# Patient Record
Sex: MALE | Race: WHITE | NOT HISPANIC OR LATINO | Employment: FULL TIME | URBAN - METROPOLITAN AREA
[De-identification: names, ages, dates, MRNs, and addresses within clinical notes are randomized per-mention and may not be internally consistent; named-entity substitution may affect disease eponyms.]

---

## 2018-01-06 ENCOUNTER — APPOINTMENT (OUTPATIENT)
Dept: RADIOLOGY | Facility: CLINIC | Age: 48
End: 2018-01-06
Payer: COMMERCIAL

## 2018-01-06 ENCOUNTER — TRANSCRIBE ORDERS (OUTPATIENT)
Dept: URGENT CARE | Facility: CLINIC | Age: 48
End: 2018-01-06

## 2018-01-06 ENCOUNTER — OFFICE VISIT (OUTPATIENT)
Dept: URGENT CARE | Facility: CLINIC | Age: 48
End: 2018-01-06
Payer: COMMERCIAL

## 2018-01-06 DIAGNOSIS — R05.9 COUGH: ICD-10-CM

## 2018-01-06 PROCEDURE — 71046 X-RAY EXAM CHEST 2 VIEWS: CPT

## 2018-01-06 PROCEDURE — G0383 LEV 4 HOSP TYPE B ED VISIT: HCPCS

## 2018-01-23 VITALS
HEART RATE: 77 BPM | HEIGHT: 68 IN | DIASTOLIC BLOOD PRESSURE: 85 MMHG | OXYGEN SATURATION: 97 % | SYSTOLIC BLOOD PRESSURE: 141 MMHG | WEIGHT: 198.25 LBS | TEMPERATURE: 99.1 F | RESPIRATION RATE: 16 BRPM | BODY MASS INDEX: 30.04 KG/M2

## 2018-01-24 NOTE — PROGRESS NOTES
Assessment    1  Cough (786 2) (R05)   2  Hypertension (401 9) (I10)   3  Acute bronchitis due to other specified organisms (466 0) (J20 8)    Plan  Acute bronchitis due to other specified organisms    · LevoFLOXacin 500 MG Oral Tablet; Take 1 tablet daily   · Follow Up if Not Better Evaluation and Treatment  Follow-up  Status: Complete  Done:  [id number]   · Avoid exposure to cigarette smoke ; Status:Complete;   Done: 08SRM8447   · Drink plenty of fluids ; Status:Complete;   Done: 77NQN0243   · Use a cough medicine to help you get adequate rest ; Status:Complete;   Done:  43WQI1071   · You may slowly resume your normal level of activity once you feel better ;  Status:Complete;   Done: 90AVH0707   · Call [phone] if: The cough is not gone in 10 days ; Status:Complete;   Done:  93YJN9873   · Call [phone] if: You have a productive cough and are bringing up secretions  (phlegm) ; Status:Complete;   Done: 72IOC4941    Discussion/Summary  Discussion Summary:   I recommend supportive care, fluids and rest  Follow-up if not a lot better in 5 days  Medication Side Effects Reviewed: Possible side effects of new medications were reviewed with the patient/guardian today  Counseling Documentation With Imm: The patient, patient's family was counseled regarding instructions for management, impressions  Chief Complaint    1  Cold Symptoms  Chief Complaint Free Text Note Form: Patient complaining of chest congestion and difficulty breathing for 3 weeks  History of Present Illness  HPI: Complains of a cough and chest congestion that is been going on for couple weeks  It has gotten worse over the last 2 or 3 days  He does have some yellowish mucus production with the cough  His father recently got over pneumonia  He works in an office  Nonsmoker  Hospital Based Practices Required Assessment:   Pain Assessment   the patient states they do not have pain   Reason DV Screen not done: family at bedside Depression And Suicide Screen  Reason suicide screen not done: family at bedside  Review of Systems  Focused-Male:   Constitutional: fever, feeling poorly and feeling tired, but no chills  ENT: no earache, no sore throat, no nasal discharge and no hoarseness  Cardiovascular: no complaints of slow or fast heart rate, no chest pain, no palpitations, no leg claudication or lower extremity edema  Respiratory: shortness of breath, cough, orthopnea, wheezing, shortness of breath during exertion and PND  Gastrointestinal: no complaints of abdominal pain, no constipation, no nausea or vomiting, no diarrhea or bloody stools  Genitourinary: no complaints of dysuria or incontinence, no hesitancy, no nocturia, no genital lesion, no inadequacy of penile erection  Musculoskeletal: no complaints of arthralgia, no myalgia, no joint swelling or stiffness, no limb pain or swelling  Integumentary: no complaints of skin rash or lesion, no itching or dry skin, no skin wounds  Neurological: no complaints of headache, no confusion, no numbness or tingling, no dizziness or fainting  Active Problems    1  Hypertension (401 9) (I10)    Past Medical History    1  History of hypertension (V12 59) (Z86 79)  Active Problems And Past Medical History Reviewed: The active problems and past medical history were reviewed and updated today  Family History  Father    1  No pertinent family history  Family History Reviewed: The family history was reviewed and updated today  Social History    · Former smoker (O27 73) (J09 941)  Social History Reviewed: The social history was reviewed and updated today  Surgical History  Surgical History Reviewed: The surgical history was reviewed and updated today  Current Meds   1  Benazepril-Hydrochlorothiazide 20-12 5 MG Oral Tablet; Therapy: (([phone]) to Recorded  Medication List Reviewed: The medication list was reviewed and updated today  Allergies    1  No Known Drug Allergies    Vitals  Signs   Recorded: [id number] 08:48AM   Temperature: 99 1 F, Tympanic  Heart Rate: 77  Respiration: 16  Systolic: 341, RUE  Diastolic: 85, RUE  Height: 5 ft 8 in  Weight: 198 lb 4 oz  BMI Calculated: 30 14  BSA Calculated: 2 04  O2 Saturation: 97    Physical Exam    Constitutional   General appearance: Abnormal   acutely ill, uncomfortable, within normal limits of ideal weight and well hydrated  Eyes   Conjunctiva and lids: No swelling, erythema, or discharge  Pupils and irises: Equal, round and reactive to light  Ears, Nose, Mouth, and Throat   External inspection of ears and nose: Normal     Otoscopic examination: Tympanic membrance translucent with normal light reflex  Canals patent without erythema  Nasal mucosa, septum, and turbinates: Normal without edema or erythema  Oropharynx: Normal with no erythema, edema, exudate or lesions  Pulmonary   Respiratory effort: No increased work of breathing or signs of respiratory distress  Auscultation of lungs: Abnormal   diffuse rales/crackles bilaterally  diffuse rhonchi bilaterally  diffuse wheezing bilaterally  Cardiovascular   Auscultation of heart: Normal rate and rhythm, normal S1 and S2, without murmurs  Abdomen   Abdomen: Non-tender, no masses  Liver and spleen: No hepatomegaly or splenomegaly  Lymphatic   Palpation of lymph nodes in neck: No lymphadenopathy  Musculoskeletal   Gait and station: Normal     Skin   Skin and subcutaneous tissue: Normal without rashes or lesions  Neurologic   Cranial nerves: Cranial nerves 2-12 intact  Reflexes: 2+ and symmetric  Sensation: No sensory loss      Psychiatric   Orientation to person, place and time: Normal     Mood and affect: Normal        Signatures   Electronically signed by : Agustina Villagomez DO; Jan 6 2018  9:50AM EST                       (Author)

## 2019-06-13 ENCOUNTER — TELEPHONE (OUTPATIENT)
Dept: OTHER | Facility: OTHER | Age: 49
End: 2019-06-13